# Patient Record
Sex: FEMALE | Race: BLACK OR AFRICAN AMERICAN | NOT HISPANIC OR LATINO | Employment: OTHER | URBAN - METROPOLITAN AREA
[De-identification: names, ages, dates, MRNs, and addresses within clinical notes are randomized per-mention and may not be internally consistent; named-entity substitution may affect disease eponyms.]

---

## 2018-03-27 ENCOUNTER — HOSPITAL ENCOUNTER (INPATIENT)
Facility: HOSPITAL | Age: 83
LOS: 2 days | Discharge: HOME HEALTH | DRG: 291 | End: 2018-03-29
Attending: EMERGENCY MEDICINE | Admitting: INTERNAL MEDICINE
Payer: MEDICARE

## 2018-03-27 DIAGNOSIS — I50.9 ACUTE ON CHRONIC CONGESTIVE HEART FAILURE, UNSPECIFIED CONGESTIVE HEART FAILURE TYPE: Primary | ICD-10-CM

## 2018-03-27 DIAGNOSIS — R06.00 DYSPNEA: ICD-10-CM

## 2018-03-27 DIAGNOSIS — R00.0 TACHYCARDIA: ICD-10-CM

## 2018-03-27 DIAGNOSIS — I50.9 CONGESTIVE HEART FAILURE: ICD-10-CM

## 2018-03-27 PROBLEM — Z91.199 NON-COMPLIANCE: Status: ACTIVE | Noted: 2018-03-27

## 2018-03-27 LAB
ALBUMIN SERPL BCP-MCNC: 3.5 G/DL
ALP SERPL-CCNC: 65 U/L
ALT SERPL W/O P-5'-P-CCNC: 24 U/L
ANION GAP SERPL CALC-SCNC: 13 MMOL/L
AST SERPL-CCNC: 30 U/L
BASOPHILS # BLD AUTO: 0.01 K/UL
BASOPHILS NFR BLD: 0.2 %
BILIRUB SERPL-MCNC: 0.8 MG/DL
BNP SERPL-MCNC: 4335 PG/ML
BUN SERPL-MCNC: 22 MG/DL
CALCIUM SERPL-MCNC: 9.6 MG/DL
CHLORIDE SERPL-SCNC: 107 MMOL/L
CO2 SERPL-SCNC: 24 MMOL/L
CREAT SERPL-MCNC: 1.3 MG/DL
DIFFERENTIAL METHOD: ABNORMAL
EOSINOPHIL # BLD AUTO: 0 K/UL
EOSINOPHIL NFR BLD: 0.9 %
ERYTHROCYTE [DISTWIDTH] IN BLOOD BY AUTOMATED COUNT: 15.2 %
EST. GFR  (AFRICAN AMERICAN): 43 ML/MIN/1.73 M^2
EST. GFR  (NON AFRICAN AMERICAN): 37 ML/MIN/1.73 M^2
GLUCOSE SERPL-MCNC: 101 MG/DL
HCT VFR BLD AUTO: 39.3 %
HGB BLD-MCNC: 13.2 G/DL
LYMPHOCYTES # BLD AUTO: 1.8 K/UL
LYMPHOCYTES NFR BLD: 42.2 %
MCH RBC QN AUTO: 28 PG
MCHC RBC AUTO-ENTMCNC: 33.6 G/DL
MCV RBC AUTO: 83 FL
MONOCYTES # BLD AUTO: 0.4 K/UL
MONOCYTES NFR BLD: 10.1 %
NEUTROPHILS # BLD AUTO: 2 K/UL
NEUTROPHILS NFR BLD: 46.6 %
PLATELET # BLD AUTO: 130 K/UL
PMV BLD AUTO: 12.8 FL
POTASSIUM SERPL-SCNC: 3.6 MMOL/L
PROT SERPL-MCNC: 7.2 G/DL
RBC # BLD AUTO: 4.72 M/UL
SODIUM SERPL-SCNC: 144 MMOL/L
TROPONIN I SERPL DL<=0.01 NG/ML-MCNC: 0.05 NG/ML
TROPONIN I SERPL DL<=0.01 NG/ML-MCNC: 0.05 NG/ML
WBC # BLD AUTO: 4.27 K/UL

## 2018-03-27 PROCEDURE — 93005 ELECTROCARDIOGRAM TRACING: CPT

## 2018-03-27 PROCEDURE — 85025 COMPLETE CBC W/AUTO DIFF WBC: CPT

## 2018-03-27 PROCEDURE — A4216 STERILE WATER/SALINE, 10 ML: HCPCS | Performed by: EMERGENCY MEDICINE

## 2018-03-27 PROCEDURE — 83880 ASSAY OF NATRIURETIC PEPTIDE: CPT

## 2018-03-27 PROCEDURE — 84484 ASSAY OF TROPONIN QUANT: CPT

## 2018-03-27 PROCEDURE — 63600175 PHARM REV CODE 636 W HCPCS: Performed by: EMERGENCY MEDICINE

## 2018-03-27 PROCEDURE — 25000003 PHARM REV CODE 250: Performed by: EMERGENCY MEDICINE

## 2018-03-27 PROCEDURE — 21400001 HC TELEMETRY ROOM

## 2018-03-27 PROCEDURE — 93010 ELECTROCARDIOGRAM REPORT: CPT | Mod: ,,, | Performed by: INTERNAL MEDICINE

## 2018-03-27 PROCEDURE — 99285 EMERGENCY DEPT VISIT HI MDM: CPT | Mod: 25

## 2018-03-27 PROCEDURE — 96374 THER/PROPH/DIAG INJ IV PUSH: CPT

## 2018-03-27 PROCEDURE — 80053 COMPREHEN METABOLIC PANEL: CPT

## 2018-03-27 PROCEDURE — 96375 TX/PRO/DX INJ NEW DRUG ADDON: CPT

## 2018-03-27 PROCEDURE — 96376 TX/PRO/DX INJ SAME DRUG ADON: CPT

## 2018-03-27 PROCEDURE — 84484 ASSAY OF TROPONIN QUANT: CPT | Mod: 91

## 2018-03-27 RX ORDER — FUROSEMIDE 10 MG/ML
40 INJECTION INTRAMUSCULAR; INTRAVENOUS 3 TIMES DAILY
Status: DISCONTINUED | OUTPATIENT
Start: 2018-03-27 | End: 2018-03-29 | Stop reason: HOSPADM

## 2018-03-27 RX ORDER — ASPIRIN 81 MG/1
81 TABLET ORAL DAILY
Status: DISCONTINUED | OUTPATIENT
Start: 2018-03-28 | End: 2018-03-29 | Stop reason: HOSPADM

## 2018-03-27 RX ORDER — FUROSEMIDE 10 MG/ML
40 INJECTION INTRAMUSCULAR; INTRAVENOUS
Status: COMPLETED | OUTPATIENT
Start: 2018-03-27 | End: 2018-03-27

## 2018-03-27 RX ORDER — CARVEDILOL 6.25 MG/1
12.5 TABLET ORAL 2 TIMES DAILY WITH MEALS
Status: DISCONTINUED | OUTPATIENT
Start: 2018-03-27 | End: 2018-03-29 | Stop reason: HOSPADM

## 2018-03-27 RX ORDER — SODIUM CHLORIDE 0.9 % (FLUSH) 0.9 %
3 SYRINGE (ML) INJECTION EVERY 8 HOURS
Status: DISCONTINUED | OUTPATIENT
Start: 2018-03-27 | End: 2018-03-29 | Stop reason: HOSPADM

## 2018-03-27 RX ORDER — AMLODIPINE BESYLATE 5 MG/1
10 TABLET ORAL DAILY
Status: DISCONTINUED | OUTPATIENT
Start: 2018-03-28 | End: 2018-03-29 | Stop reason: HOSPADM

## 2018-03-27 RX ORDER — ACETAMINOPHEN 500 MG
1000 TABLET ORAL EVERY 6 HOURS PRN
Status: DISCONTINUED | OUTPATIENT
Start: 2018-03-27 | End: 2018-03-29 | Stop reason: HOSPADM

## 2018-03-27 RX ORDER — ATORVASTATIN CALCIUM 40 MG/1
40 TABLET, FILM COATED ORAL DAILY
Status: DISCONTINUED | OUTPATIENT
Start: 2018-03-28 | End: 2018-03-29 | Stop reason: HOSPADM

## 2018-03-27 RX ORDER — ENOXAPARIN SODIUM 100 MG/ML
40 INJECTION SUBCUTANEOUS EVERY 24 HOURS
Status: DISCONTINUED | OUTPATIENT
Start: 2018-03-27 | End: 2018-03-28 | Stop reason: DRUGHIGH

## 2018-03-27 RX ORDER — ACETAMINOPHEN 500 MG
1000 TABLET ORAL
Status: COMPLETED | OUTPATIENT
Start: 2018-03-27 | End: 2018-03-27

## 2018-03-27 RX ORDER — HYDRALAZINE HYDROCHLORIDE 20 MG/ML
10 INJECTION INTRAMUSCULAR; INTRAVENOUS
Status: COMPLETED | OUTPATIENT
Start: 2018-03-27 | End: 2018-03-27

## 2018-03-27 RX ORDER — NAPROXEN SODIUM 220 MG/1
81 TABLET, FILM COATED ORAL
Status: COMPLETED | OUTPATIENT
Start: 2018-03-27 | End: 2018-03-27

## 2018-03-27 RX ORDER — LOSARTAN POTASSIUM 25 MG/1
100 TABLET ORAL DAILY
Status: DISCONTINUED | OUTPATIENT
Start: 2018-03-28 | End: 2018-03-29 | Stop reason: HOSPADM

## 2018-03-27 RX ADMIN — FUROSEMIDE 40 MG: 10 INJECTION, SOLUTION INTRAMUSCULAR; INTRAVENOUS at 10:03

## 2018-03-27 RX ADMIN — HYDRALAZINE HYDROCHLORIDE 10 MG: 20 INJECTION INTRAMUSCULAR; INTRAVENOUS at 06:03

## 2018-03-27 RX ADMIN — HYDRALAZINE HYDROCHLORIDE 10 MG: 20 INJECTION INTRAMUSCULAR; INTRAVENOUS at 11:03

## 2018-03-27 RX ADMIN — CARVEDILOL 12.5 MG: 6.25 TABLET, FILM COATED ORAL at 10:03

## 2018-03-27 RX ADMIN — ACETAMINOPHEN 1000 MG: 500 TABLET ORAL at 06:03

## 2018-03-27 RX ADMIN — Medication 3 ML: at 10:03

## 2018-03-27 RX ADMIN — ENOXAPARIN SODIUM 40 MG: 100 INJECTION SUBCUTANEOUS at 10:03

## 2018-03-27 RX ADMIN — ASPIRIN 81 MG 81 MG: 81 TABLET ORAL at 11:03

## 2018-03-27 NOTE — PROVIDER PROGRESS NOTES - EMERGENCY DEPT.
Encounter Date: 3/27/2018    ED Physician Progress Notes       SCRIBE NOTE: INash, am scribing for, and in the presence of,  Conner Amaro MD.  Physician Statement: IConner MD, personally performed the services described in this documentation as scribed by Nash Garcia in my presence, and it is both accurate and complete.     Physician Note:   Patient still hypertensive, sitting up in bed. Speaking in half-sentences.

## 2018-03-27 NOTE — ED PROVIDER NOTES
"Encounter Date: 3/27/2018    SCRIBE #1 NOTE: I, Nash Garcia, am scribing for, and in the presence of,  Conner Amaro MD. I have scribed the following portions of the note - Other sections scribed: HPI, ROS, and PE.       History     Chief Complaint   Patient presents with    Shortness of Breath     Pt reports increased shortness of breath starting last night. History of CHF.      CC: Shortness of Breath    HPI: 85 y.o. female with history of CHF, HTN and HLD presents to ED via EMS c/o SOB with associated chest tightness since 1130 last night. Symptoms are constant EMS applied nitroglycerin patch en route. Patient states "I think I have a fluid buildup." She has had recurrent episodes of fluid buildup for 2 mos. Dosage for her fluid medication was increased 1 mo ago. Patient has not added extra salt to her diet. No further complaints.      The history is provided by the patient. No  was used.     Review of patient's allergies indicates:   Allergen Reactions    Darvocet a500 [propoxyphene n-acetaminophen] Other (See Comments)     Dizziness     Past Medical History:   Diagnosis Date    Gout     Hyperlipidemia     Hypertension      Past Surgical History:   Procedure Laterality Date    CHOLECYSTECTOMY       History reviewed. No pertinent family history.  Social History   Substance Use Topics    Smoking status: Never Smoker    Smokeless tobacco: Not on file    Alcohol use No     Review of Systems   Constitutional: Negative for diaphoresis and fatigue.   Eyes: Negative for visual disturbance.   Respiratory: Positive for chest tightness and shortness of breath. Negative for cough.    Cardiovascular: Negative for chest pain and palpitations.   Gastrointestinal: Negative for abdominal pain, diarrhea, nausea and vomiting.   Genitourinary: Negative for dysuria.   Neurological: Negative for headaches.       Physical Exam     Initial Vitals [03/27/18 0822]   BP Pulse Resp Temp SpO2   (!) " 182/100 100 (!) 22 98.2 °F (36.8 °C) 98 %      MAP       127.33         Physical Exam    Nursing note and vitals reviewed.  Constitutional: She appears well-developed and well-nourished. She is cooperative.  Non-toxic appearance. No distress.   HENT:   Head: Normocephalic and atraumatic.   Mouth/Throat: Oropharynx is clear and moist.   Eyes: Conjunctivae and EOM are normal. Pupils are equal, round, and reactive to light.   Left lens opacity  Arcus senilis   Neck: Normal range of motion and full passive range of motion without pain. Neck supple. No thyromegaly present. No JVD present.   Cardiovascular: Normal rate, normal heart sounds and normal pulses.   Occasional PVCs on monitor   Pulmonary/Chest: Effort normal. No respiratory distress.   High basilar crackles   Abdominal: Soft. Normal appearance and bowel sounds are normal. She exhibits no distension and no mass. There is no tenderness.   Musculoskeletal: Normal range of motion.   Neurological: She is alert and oriented to person, place, and time. She has normal strength. No cranial nerve deficit or sensory deficit.   Skin: Skin is warm, dry and intact. No rash noted.   Psychiatric: She has a normal mood and affect. Her speech is normal and behavior is normal. Judgment and thought content normal.         ED Course   Critical Care  Date/Time: 3/27/2018 11:25 AM  Performed by: HYACINTH PALACIOS.  Authorized by: HYACINTH PALACIOS   Direct patient critical care time: 30 minutes  Additional history critical care time: 5 minutes  Ordering / reviewing critical care time: 5 minutes  Documentation critical care time: 5 minutes  Consulting other physicians critical care time: 5 minutes  Total critical care time (exclusive of procedural time) : 50 minutes  Critical care time was exclusive of separately billable procedures and treating other patients and teaching time.  Critical care was necessary to treat or prevent imminent or life-threatening deterioration of the  following conditions: cardiac failure and circulatory failure.  Critical care was time spent personally by me on the following activities: evaluation of patient's response to treatment, obtaining history from patient or surrogate, pulse oximetry, review of old charts, examination of patient, ordering and performing treatments and interventions, ordering and review of radiographic studies and re-evaluation of patient's condition.        Labs Reviewed   CBC W/ AUTO DIFFERENTIAL - Abnormal; Notable for the following:        Result Value    RDW 15.2 (*)     Platelets 130 (*)     All other components within normal limits    Narrative:     Recoll. [id number] by FÉLIX at 03/27/2018 09:52, reason: Specimen   hemolyzed.   03/27/2018  09:52 Called Kristin.Tube has been   refrigerated   COMPREHENSIVE METABOLIC PANEL - Abnormal; Notable for the following:     eGFR if  43 (*)     eGFR if non  37 (*)     All other components within normal limits    Narrative:     Recoll. [id number] by FÉLIX at 03/27/2018 09:52, reason: Specimen   hemolyzed.   03/27/2018  09:52 Called Kristin.Tube has been   refrigerated   TROPONIN I - Abnormal; Notable for the following:     Troponin I 0.053 (*)     All other components within normal limits    Narrative:     Recoll. [id number] by FÉLIX at 03/27/2018 09:52, reason: Specimen   hemolyzed.   03/27/2018  09:52 Called Kristin.Tube has been   refrigerated   B-TYPE NATRIURETIC PEPTIDE - Abnormal; Notable for the following:     BNP 4,335 (*)     All other components within normal limits    Narrative:     Recoll. [id number] by FÉLIX at 03/27/2018 09:52, reason: Specimen   hemolyzed.   03/27/2018  09:52 Called [email or web address].Tube has been   refrigerated     EKG Readings: (Independently Interpreted)   Initial Reading: No STEMI. Previous EKG: Compared with most recent EKG Previous EKG Date: 8/27/2015. Rhythm: Normal Sinus Rhythm. Ectopy: PVCs. Conduction: Normal. T Waves Flipped: V5, V6, II, III  and AVF. Axis: Normal. Other Impression: LVH, PVc's new compared to 8/27/2017          Medical Decision Making:   Post lasix, good diuresis. Still with 1/2 sentences and dyspnea. Noted persistent htn will offer hydralazine and admit. Discussed with Dr. Garcia.     Noted in ER to have frequent runs of supraventricular appearing tachycardia. No indix for rhythm suppression, pt is asymptomatic and breathing is much improved.             Scribe Attestation:   Scribe #1: I performed the above scribed service and the documentation accurately describes the services I performed. I attest to the accuracy of the note.    Attending Attestation:           Physician Attestation for Scribe:  Physician Attestation Statement for Scribe #1: I, Conner Amaro MD, reviewed documentation, as scribed by Nash Garcia in my presence, and it is both accurate and complete.                    Clinical Impression:   The primary encounter diagnosis was Acute on chronic congestive heart failure, unspecified congestive heart failure type. A diagnosis of Dyspnea was also pertinent to this visit.                           Conner Amaro MD  03/27/18 1126       Conner Amaro MD  03/27/18 1710       Conner Amaro MD  03/27/18 1711       Conner Amaro MD  03/27/18 1718

## 2018-03-27 NOTE — ED PROVIDER NOTES
Encounter Date: 3/27/2018       History     Chief Complaint   Patient presents with    Shortness of Breath     Pt reports increased shortness of breath starting last night. History of CHF.      HPI  Review of patient's allergies indicates:   Allergen Reactions    Darvocet a500 [propoxyphene n-acetaminophen] Other (See Comments)     Dizziness     Past Medical History:   Diagnosis Date    Gout     Hyperlipidemia     Hypertension      Past Surgical History:   Procedure Laterality Date    CHOLECYSTECTOMY       History reviewed. No pertinent family history.  Social History   Substance Use Topics    Smoking status: Never Smoker    Smokeless tobacco: Not on file    Alcohol use No     Review of Systems    Physical Exam     Initial Vitals [03/27/18 0822]   BP Pulse Resp Temp SpO2   (!) 182/100 100 (!) 22 98.2 °F (36.8 °C) 98 %      MAP       127.33         Physical Exam    ED Course   Procedures  Labs Reviewed   CBC W/ AUTO DIFFERENTIAL - Abnormal; Notable for the following:        Result Value    RDW 15.2 (*)     Platelets 130 (*)     All other components within normal limits    Narrative:     Recoll. [id number] by FÉLIX at 03/27/2018 09:52, reason: Specimen   hemolyzed.   03/27/2018  09:52 Called Kristin.Tube has been   refrigerated   COMPREHENSIVE METABOLIC PANEL - Abnormal; Notable for the following:     eGFR if  43 (*)     eGFR if non  37 (*)     All other components within normal limits    Narrative:     Recoll. [id number] by FÉLIX at 03/27/2018 09:52, reason: Specimen   hemolyzed.   03/27/2018  09:52 Called Kristin.Tube has been   refrigerated   TROPONIN I - Abnormal; Notable for the following:     Troponin I 0.053 (*)     All other components within normal limits    Narrative:     Recoll. [id number] by FÉLIX at 03/27/2018 09:52, reason: Specimen   hemolyzed.   03/27/2018  09:52 Called Kristin.Tube has been   refrigerated   B-TYPE NATRIURETIC PEPTIDE - Abnormal; Notable for the  following:     BNP 4,335 (*)     All other components within normal limits    Narrative:     Recoll. [id number] by FÉLIX at 03/27/2018 09:52, reason: Specimen   hemolyzed.   03/27/2018  09:52 Called Kristin.Tube has been   refrigerated                                  Clinical Impression:   {Add your Clinical Impression here. If you haven't documented one yet, please pend the note, finalize a Clinical Impression, and refresh your note before signing.:[id number]}

## 2018-03-27 NOTE — SUBJECTIVE & OBJECTIVE
Past Medical History:   Diagnosis Date    Gout     Hyperlipidemia     Hypertension        Past Surgical History:   Procedure Laterality Date    CHOLECYSTECTOMY         Review of patient's allergies indicates:   Allergen Reactions    Darvocet a500 [propoxyphene n-acetaminophen] Other (See Comments)     Dizziness       No current facility-administered medications on file prior to encounter.      Current Outpatient Prescriptions on File Prior to Encounter   Medication Sig    amlodipine (NORVASC) 10 MG tablet Take 10 mg by mouth once daily.    aspirin (ECOTRIN) 81 MG EC tablet Take 81 mg by mouth once daily.    atorvastatin (LIPITOR) 40 MG tablet Take 40 mg by mouth once daily.    carvedilol (COREG) 12.5 MG tablet Take 12.5 mg by mouth 2 (two) times daily with meals.    furosemide (LASIX) 40 MG tablet Take 40 mg by mouth once daily.    losartan (COZAAR) 100 MG tablet Take 100 mg by mouth once daily.    acetaminophen (TYLENOL) 500 MG tablet Take 500 mg by mouth every 6 (six) hours as needed for Pain.     Family History     None        Social History Main Topics    Smoking status: Never Smoker    Smokeless tobacco: Not on file    Alcohol use No    Drug use: No    Sexual activity: Not Currently     Review of Systems   Constitutional: Negative for activity change, appetite change, chills, diaphoresis, fatigue and fever.   HENT: Negative for congestion.    Eyes: Negative for discharge.   Respiratory: Positive for shortness of breath. Negative for apnea, cough, choking, chest tightness, wheezing and stridor.    Cardiovascular: Positive for leg swelling. Negative for chest pain.   Gastrointestinal: Negative for abdominal distention, abdominal pain, diarrhea, nausea and vomiting.   Endocrine: Negative for cold intolerance.   Genitourinary: Negative for difficulty urinating.   Musculoskeletal: Negative for arthralgias.   Neurological: Negative for dizziness and facial asymmetry.   Psychiatric/Behavioral:  Negative for agitation and behavioral problems.     Objective:     Vital Signs (Most Recent):  Temp: 97.9 °F (36.6 °C) (03/27/18 0848)  Pulse: 82 (03/27/18 1240)  Resp: (!) 22 (03/27/18 0822)  BP: (!) 189/89 (03/27/18 1240)  SpO2: 99 % (03/27/18 1240) Vital Signs (24h Range):  Temp:  [97.9 °F (36.6 °C)-98.2 °F (36.8 °C)] 97.9 °F (36.6 °C)  Pulse:  [] 82  Resp:  [22] 22  SpO2:  [98 %-100 %] 99 %  BP: (179-210)/() 189/89     Weight: 66.2 kg (146 lb)  Body mass index is 29.49 kg/m².    Physical Exam   Constitutional: She is oriented to person, place, and time. She appears well-developed and well-nourished.   HENT:   Head: Normocephalic and atraumatic.   Cardiovascular: Normal rate.  Exam reveals no gallop and no friction rub.    Pulmonary/Chest: Effort normal. No respiratory distress. She has rales.   Abdominal: Soft. Bowel sounds are normal. She exhibits no distension. There is no guarding.   Neurological: She is alert and oriented to person, place, and time.   Skin: Skin is warm and dry.   Psychiatric: She has a normal mood and affect. Her behavior is normal.   Vitals reviewed.          Significant Labs:  CXR reviewed.  BNP 4,335  Troponin .053    EKG NSR with PVC's.

## 2018-03-27 NOTE — H&P
[facility] Hospital Medicine  History & Physical    Patient Name: Kasie Faye  MRN: [id number]  Admission Date: 3/27/2018  Attending Physician: Conner Amaro MD   Primary Care Provider: [facility]         Patient information was obtained from patient and ER records.     Subjective:     Principal Problem:Acute on chronic congestive heart failure    Chief Complaint:   Chief Complaint   Patient presents with    Shortness of Breath     Pt reports increased shortness of breath starting last night. History of CHF.         HPI: Mrs. Faye is a 86 yo F from home who presents with a CC of SOB since last night. The patient has a known history of hypertension but states that she is non compliant with both diet and medications.  She was in her normal state of health until last night when she started to experience SOB. The patient admits to a known history of heart failure and noted a hospital admission last in 2010 for this. She does not have an echo on file here. She presents to the ED and is found to have a BNP of 4,335 in the setting of normal renal function.  She denied any CP. She states this feels similar to her CHF in 2010. The patient is resting comfortably and did not present with respiratory distress or failure.  The patient lives at home alone and does not use a walker or cane.        Past Medical History:   Diagnosis Date    Gout     Hyperlipidemia     Hypertension        Past Surgical History:   Procedure Laterality Date    CHOLECYSTECTOMY         Review of patient's allergies indicates:   Allergen Reactions    Darvocet a500 [propoxyphene n-acetaminophen] Other (See Comments)     Dizziness       No current facility-administered medications on file prior to encounter.      Current Outpatient Prescriptions on File Prior to Encounter   Medication Sig    amlodipine (NORVASC) 10 MG tablet Take 10 mg by mouth once daily.    aspirin (ECOTRIN) 81 MG EC tablet Take  81 mg by mouth once daily.    atorvastatin (LIPITOR) 40 MG tablet Take 40 mg by mouth once daily.    carvedilol (COREG) 12.5 MG tablet Take 12.5 mg by mouth 2 (two) times daily with meals.    furosemide (LASIX) 40 MG tablet Take 40 mg by mouth once daily.    losartan (COZAAR) 100 MG tablet Take 100 mg by mouth once daily.    acetaminophen (TYLENOL) 500 MG tablet Take 500 mg by mouth every 6 (six) hours as needed for Pain.     Family History     None        Social History Main Topics    Smoking status: Never Smoker    Smokeless tobacco: Not on file    Alcohol use No    Drug use: No    Sexual activity: Not Currently     Review of Systems   Constitutional: Negative for activity change, appetite change, chills, diaphoresis, fatigue and fever.   HENT: Negative for congestion.    Eyes: Negative for discharge.   Respiratory: Positive for shortness of breath. Negative for apnea, cough, choking, chest tightness, wheezing and stridor.    Cardiovascular: Positive for leg swelling. Negative for chest pain.   Gastrointestinal: Negative for abdominal distention, abdominal pain, diarrhea, nausea and vomiting.   Endocrine: Negative for cold intolerance.   Genitourinary: Negative for difficulty urinating.   Musculoskeletal: Negative for arthralgias.   Neurological: Negative for dizziness and facial asymmetry.   Psychiatric/Behavioral: Negative for agitation and behavioral problems.     Objective:     Vital Signs (Most Recent):  Temp: 97.9 °F (36.6 °C) (03/27/18 0848)  Pulse: 82 (03/27/18 1240)  Resp: (!) 22 (03/27/18 0822)  BP: (!) 189/89 (03/27/18 1240)  SpO2: 99 % (03/27/18 1240) Vital Signs (24h Range):  Temp:  [97.9 °F (36.6 °C)-98.2 °F (36.8 °C)] 97.9 °F (36.6 °C)  Pulse:  [] 82  Resp:  [22] 22  SpO2:  [98 %-100 %] 99 %  BP: (179-210)/() 189/89     Weight: 66.2 kg (146 lb)  Body mass index is 29.49 kg/m².    Physical Exam   Constitutional: She is oriented to person, place, and time. She appears  well-developed and well-nourished.   HENT:   Head: Normocephalic and atraumatic.   Cardiovascular: Normal rate.  Exam reveals no gallop and no friction rub.    Pulmonary/Chest: Effort normal. No respiratory distress. She has rales.   Abdominal: Soft. Bowel sounds are normal. She exhibits no distension. There is no guarding.   Neurological: She is alert and oriented to person, place, and time.   Skin: Skin is warm and dry.   Psychiatric: She has a normal mood and affect. Her behavior is normal.   Vitals reviewed.          Significant Labs:  CXR reviewed.  BNP 4,335  Troponin .053    EKG NSR with PVC's.           Assessment/Plan:     * Acute on chronic congestive heart failure    Unknown type as no previous EKG. Presenting with a BNP of 4,335 in setting of normal renal function.    Will check Echo. Start on IV lasix.  Expect short hospital stay.  Non compliance with diet and medications likely the cause.             Non-compliance    As discussed.             Hyperlipidemia    Resume home meds.           Benign essential hypertension    Presented with elevated BP's- not taking meds as prescribed. Discussion held with the patient and family           elevated troponin- likely from acute CHF. Will trend out.     VTE Risk Mitigation     None        Lasix. Echo. Home in 1-2 days.          Sly Garcia MD  Department of Hospital Medicine   [facility]

## 2018-03-27 NOTE — ED NOTES
Report received from IBAN Desai for lunch relief. Pt appears in no acute distress, is resting quietly, and is alert at this time. No further needs identified.

## 2018-03-27 NOTE — ASSESSMENT & PLAN NOTE
Presented with elevated BP's- not taking meds as prescribed. Discussion held with the patient and family

## 2018-03-27 NOTE — ASSESSMENT & PLAN NOTE
Unknown type as no previous EKG. Presenting with a BNP of 4,335 in setting of normal renal function.    Will check Echo. Start on IV lasix.  Expect short hospital stay.  Non compliance with diet and medications likely the cause.

## 2018-03-27 NOTE — PLAN OF CARE
Patient from home alone and independent.  Daughter available to help at home if needed.  Patient receives primary care at [facility] and prefers morning appts..  Stated that [facility] also provides her medications.  Patient plans to return to home at discharge.       03/27/18 1145   Discharge Assessment   Assessment Type Discharge Planning Assessment   Confirmed/corrected address and phone number on facesheet? Yes   Assessment information obtained from? Patient  (Daughter Jailene at the bedside.)   Prior to hospitilization cognitive status: Alert/Oriented   Prior to hospitalization functional status: Independent   Current cognitive status: Alert/Oriented   Current Functional Status: Needs Assistance   Facility Arrived From: home   Lives With alone   Able to Return to Prior Arrangements yes   Is patient able to care for self after discharge? Unable to determine at this time (comments)   Who are your caregiver(s) and their phone number(s)? Jailene Mistry: [phone]   Patient's perception of discharge disposition home or selfcare   Readmission Within The Last 30 Days no previous admission in last 30 days   Patient currently being followed by outpatient case management? No   Patient currently receives any other outside agency services? No   Equipment Currently Used at Home none   Do you have any problems affording any of your prescribed medications? No   Is the patient taking medications as prescribed? yes   Does the patient have transportation home? Yes   Transportation Available car   Does the patient receive [facility] at [facility]? (Unknown)   Discharge Plan A Home   Discharge Plan B Home   Patient/Family In Agreement With Plan yes   Sherine Carlos LMSW, [place], CCM  3/27/2018

## 2018-03-27 NOTE — HOSPITAL COURSE
The patient presents with a CC of SOB and is found to be in heart failure with a BNP of 4,335. The patient has a known history of CHF but does not have an echo here.  She was started on IV lasix.  An echo of the heart was ordered. The patient clinically improved rapidly. Her Echo showed an EF of 40-45% with diastolic heart failure. She will be discharged on the appropriate meds. She is requesting discharge. No 02 requirements. CC of SOB has resolved.  Activity as tolerated. Diet- low NA. Follow up with PCP in one week. Will have cards follow up in one week with Dr. Rain. Follow up with ortho in 1-2 weeks for R ankle pain- possible ligament injury per x-rays.       I will call the patient at home to check on on 3/30.

## 2018-03-27 NOTE — HPI
Mrs. Faye is a 86 yo F from home who presents with a CC of SOB since last night. The patient has a known history of hypertension but states that she is non compliant with both diet and medications.  She was in her normal state of health until last night when she started to experience SOB. The patient admits to a known history of heart failure and noted a hospital admission last in 2010 for this. She does not have an echo on file here. She presents to the ED and is found to have a BNP of 4,335 in the setting of normal renal function.  She denied any CP. She states this feels similar to her CHF in 2010. The patient is resting comfortably and did not present with respiratory distress or failure.  The patient lives at home alone and does not use a walker or cane.

## 2018-03-27 NOTE — ED NOTES
Patient additional EKG reading atrial tachycardia with PVCs. Patient asymptomatic and MD aware of rhythm. No interventions at this time

## 2018-03-27 NOTE — ED TRIAGE NOTES
Patient presents via EMS with c/o chest tightness, productive cough mixed with streaks of blood and SOB since last night. Denies N/V, abdominal pain, dizziness, headache

## 2018-03-28 PROBLEM — N18.30 CKD (CHRONIC KIDNEY DISEASE), STAGE III: Status: ACTIVE | Noted: 2018-03-28

## 2018-03-28 PROBLEM — R79.89 ELEVATED TROPONIN: Status: ACTIVE | Noted: 2018-03-28

## 2018-03-28 LAB
ALBUMIN SERPL BCP-MCNC: 3.1 G/DL
ALP SERPL-CCNC: 59 U/L
ALT SERPL W/O P-5'-P-CCNC: 18 U/L
ANION GAP SERPL CALC-SCNC: 13 MMOL/L
ANION GAP SERPL CALC-SCNC: 13 MMOL/L
AORTIC VALVE REGURGITATION: ABNORMAL
AST SERPL-CCNC: 27 U/L
BASOPHILS # BLD AUTO: 0.01 K/UL
BASOPHILS NFR BLD: 0.2 %
BILIRUB SERPL-MCNC: 1 MG/DL
BUN SERPL-MCNC: 21 MG/DL
BUN SERPL-MCNC: 22 MG/DL
CALCIUM SERPL-MCNC: 9.5 MG/DL
CALCIUM SERPL-MCNC: 9.5 MG/DL
CHLORIDE SERPL-SCNC: 105 MMOL/L
CHLORIDE SERPL-SCNC: 106 MMOL/L
CO2 SERPL-SCNC: 23 MMOL/L
CO2 SERPL-SCNC: 25 MMOL/L
CREAT SERPL-MCNC: 1.2 MG/DL
CREAT SERPL-MCNC: 1.4 MG/DL
DIASTOLIC DYSFUNCTION: YES
DIFFERENTIAL METHOD: ABNORMAL
EOSINOPHIL # BLD AUTO: 0 K/UL
EOSINOPHIL NFR BLD: 1 %
ERYTHROCYTE [DISTWIDTH] IN BLOOD BY AUTOMATED COUNT: 15.2 %
EST. GFR  (AFRICAN AMERICAN): 40 ML/MIN/1.73 M^2
EST. GFR  (AFRICAN AMERICAN): 48 ML/MIN/1.73 M^2
EST. GFR  (NON AFRICAN AMERICAN): 34 ML/MIN/1.73 M^2
EST. GFR  (NON AFRICAN AMERICAN): 41 ML/MIN/1.73 M^2
ESTIMATED PA SYSTOLIC PRESSURE: 24.53
GLUCOSE SERPL-MCNC: 72 MG/DL
GLUCOSE SERPL-MCNC: 86 MG/DL
HCT VFR BLD AUTO: 38.9 %
HGB BLD-MCNC: 12.9 G/DL
LYMPHOCYTES # BLD AUTO: 1.4 K/UL
LYMPHOCYTES NFR BLD: 32.8 %
MAGNESIUM SERPL-MCNC: 1.9 MG/DL
MCH RBC QN AUTO: 27.6 PG
MCHC RBC AUTO-ENTMCNC: 33.2 G/DL
MCV RBC AUTO: 83 FL
MITRAL VALVE REGURGITATION: ABNORMAL
MONOCYTES # BLD AUTO: 0.6 K/UL
MONOCYTES NFR BLD: 15 %
NEUTROPHILS # BLD AUTO: 2.1 K/UL
NEUTROPHILS NFR BLD: 50.8 %
PHOSPHATE SERPL-MCNC: 4.5 MG/DL
PLATELET # BLD AUTO: 131 K/UL
PMV BLD AUTO: 12.2 FL
POTASSIUM SERPL-SCNC: 3.5 MMOL/L
POTASSIUM SERPL-SCNC: 3.8 MMOL/L
PROT SERPL-MCNC: 6.5 G/DL
RBC # BLD AUTO: 4.68 M/UL
RETIRED EF AND QEF - SEE NOTES: 40 (ref 55–65)
SODIUM SERPL-SCNC: 142 MMOL/L
SODIUM SERPL-SCNC: 143 MMOL/L
TRICUSPID VALVE REGURGITATION: ABNORMAL
TROPONIN I SERPL DL<=0.01 NG/ML-MCNC: 0.08 NG/ML
TROPONIN I SERPL DL<=0.01 NG/ML-MCNC: 0.09 NG/ML
WBC # BLD AUTO: 4.21 K/UL

## 2018-03-28 PROCEDURE — 84100 ASSAY OF PHOSPHORUS: CPT

## 2018-03-28 PROCEDURE — A4216 STERILE WATER/SALINE, 10 ML: HCPCS | Performed by: EMERGENCY MEDICINE

## 2018-03-28 PROCEDURE — 63600175 PHARM REV CODE 636 W HCPCS: Performed by: EMERGENCY MEDICINE

## 2018-03-28 PROCEDURE — 85025 COMPLETE CBC W/AUTO DIFF WBC: CPT

## 2018-03-28 PROCEDURE — 63600175 PHARM REV CODE 636 W HCPCS: Performed by: INTERNAL MEDICINE

## 2018-03-28 PROCEDURE — 83735 ASSAY OF MAGNESIUM: CPT

## 2018-03-28 PROCEDURE — 25000003 PHARM REV CODE 250: Performed by: EMERGENCY MEDICINE

## 2018-03-28 PROCEDURE — 80048 BASIC METABOLIC PNL TOTAL CA: CPT

## 2018-03-28 PROCEDURE — 80053 COMPREHEN METABOLIC PANEL: CPT

## 2018-03-28 PROCEDURE — 21400001 HC TELEMETRY ROOM

## 2018-03-28 PROCEDURE — 93306 TTE W/DOPPLER COMPLETE: CPT

## 2018-03-28 PROCEDURE — 36415 COLL VENOUS BLD VENIPUNCTURE: CPT

## 2018-03-28 PROCEDURE — 84484 ASSAY OF TROPONIN QUANT: CPT

## 2018-03-28 PROCEDURE — 84484 ASSAY OF TROPONIN QUANT: CPT | Mod: 91

## 2018-03-28 PROCEDURE — 93306 TTE W/DOPPLER COMPLETE: CPT | Mod: 26,,, | Performed by: INTERNAL MEDICINE

## 2018-03-28 RX ORDER — ENOXAPARIN SODIUM 100 MG/ML
30 INJECTION SUBCUTANEOUS EVERY 24 HOURS
Status: DISCONTINUED | OUTPATIENT
Start: 2018-03-28 | End: 2018-03-29 | Stop reason: HOSPADM

## 2018-03-28 RX ADMIN — NITROGLYCERIN 1 INCH: 20 OINTMENT TOPICAL at 12:03

## 2018-03-28 RX ADMIN — NITROGLYCERIN 1 INCH: 20 OINTMENT TOPICAL at 06:03

## 2018-03-28 RX ADMIN — ASPIRIN 81 MG: 81 TABLET, COATED ORAL at 09:03

## 2018-03-28 RX ADMIN — ENOXAPARIN SODIUM 30 MG: 100 INJECTION SUBCUTANEOUS at 06:03

## 2018-03-28 RX ADMIN — Medication 3 ML: at 10:03

## 2018-03-28 RX ADMIN — ATORVASTATIN CALCIUM 40 MG: 40 TABLET, FILM COATED ORAL at 09:03

## 2018-03-28 RX ADMIN — Medication 3 ML: at 06:03

## 2018-03-28 RX ADMIN — LOSARTAN POTASSIUM 100 MG: 25 TABLET, FILM COATED ORAL at 09:03

## 2018-03-28 RX ADMIN — Medication 3 ML: at 02:03

## 2018-03-28 RX ADMIN — CARVEDILOL 12.5 MG: 6.25 TABLET, FILM COATED ORAL at 07:03

## 2018-03-28 RX ADMIN — NITROGLYCERIN 1 INCH: 20 OINTMENT TOPICAL at 09:03

## 2018-03-28 RX ADMIN — FUROSEMIDE 40 MG: 10 INJECTION, SOLUTION INTRAMUSCULAR; INTRAVENOUS at 09:03

## 2018-03-28 RX ADMIN — FUROSEMIDE 40 MG: 10 INJECTION, SOLUTION INTRAMUSCULAR; INTRAVENOUS at 03:03

## 2018-03-28 RX ADMIN — AMLODIPINE BESYLATE 10 MG: 5 TABLET ORAL at 09:03

## 2018-03-28 RX ADMIN — CARVEDILOL 12.5 MG: 6.25 TABLET, FILM COATED ORAL at 06:03

## 2018-03-28 NOTE — PLAN OF CARE
Problem: Patient Care Overview  Goal: Plan of Care Review  Outcome: Ongoing (interventions implemented as appropriate)  All systems assessed. No falls or injuries this shift. Will continue to monitor.

## 2018-03-28 NOTE — ASSESSMENT & PLAN NOTE
Unknown type as no previous EKG. Presenting with a BNP of 4,335 in setting of normal renal function.    Will check Echo. Start on IV lasix.  Expect short hospital stay.  Non compliance with diet and medications likely the cause.   Echo today. Continue lasix.  Clinically improved. BNP in am.  Likely home on 3/29.

## 2018-03-28 NOTE — PROGRESS NOTES
[facility] Hospital Medicine  Progress Note    Patient Name: Kasie Faye  MRN: [id number]  Patient Class: IP- Inpatient   Admission Date: 3/27/2018  Length of Stay: 1 days  Attending Physician: Sly Adams MD  Primary Care Provider: [facility]        Subjective:     Principal Problem:Acute on chronic congestive heart failure    HPI:  Mrs. Faye is a 86 yo F from home who presents with a CC of SOB since last night. The patient has a known history of hypertension but states that she is non compliant with both diet and medications.  She was in her normal state of health until last night when she started to experience SOB. The patient admits to a known history of heart failure and noted a hospital admission last in 2010 for this. She does not have an echo on file here. She presents to the ED and is found to have a BNP of 4,335 in the setting of normal renal function.  She denied any CP. She states this feels similar to her CHF in 2010. The patient is resting comfortably and did not present with respiratory distress or failure.  The patient lives at home alone and does not use a walker or cane.        Hospital Course:  The patient presents with a CC of SOB and is found to be in heart failure with a BNP of 4,335. The patient has a known history of CHF but does not have an echo here.  She was started on IV lasix.  An echo of the heart was ordered.       Interval History: Feels better.  Less SOB     Review of Systems   Constitutional: Negative for activity change.   HENT: Negative for congestion.    Respiratory: Negative for chest tightness and shortness of breath.    Cardiovascular: Negative for chest pain.   Gastrointestinal: Negative for abdominal distention.   Genitourinary: Negative for difficulty urinating.     Objective:     Vital Signs (Most Recent):  Temp: 98.7 °F (37.1 °C) (03/28/18 0733)  Pulse: 68 (03/28/18 0733)  Resp: 18 (03/28/18 0733)  BP: (!) 151/72  (03/28/18 0733)  SpO2: (!) 93 % (03/28/18 0733) Vital Signs (24h Range):  Temp:  [97.8 °F (36.6 °C)-98.7 °F (37.1 °C)] 98.7 °F (37.1 °C)  Pulse:  [61-94] 68  Resp:  [18] 18  SpO2:  [93 %-100 %] 93 %  BP: (146-218)/() 151/72     Weight: 62.2 kg (137 lb 2 oz)  Body mass index is 27.7 kg/m².    Intake/Output Summary (Last 24 hours) at 03/28/18 1109  Last data filed at 03/28/18 0600   Gross per 24 hour   Intake                3 ml   Output              300 ml   Net             -297 ml      Physical Exam   Constitutional: She is oriented to person, place, and time. She appears well-developed and well-nourished.   HENT:   Head: Normocephalic and atraumatic.   Cardiovascular: Normal rate and normal heart sounds.    Pulmonary/Chest: Effort normal and breath sounds normal. No respiratory distress. She has no wheezes. She has no rales.   Neurological: She is alert and oriented to person, place, and time.   Psychiatric: She has a normal mood and affect. Her behavior is normal.   Vitals reviewed.       Significant Labs:   BMP:   Recent Labs  Lab 03/28/18  0730   GLU 72      K 3.5      CO2 25   BUN 22   CREATININE 1.4   CALCIUM 9.5   MG 1.9     CBC:   Recent Labs  Lab 03/27/18  1007 03/28/18  0729   WBC 4.27 4.21   HGB 13.2 12.9   HCT 39.3 38.9   * 131*       Significant Imaging:    Assessment/Plan:      * Acute on chronic congestive heart failure    Unknown type as no previous EKG. Presenting with a BNP of 4,335 in setting of normal renal function.    Will check Echo. Start on IV lasix.  Expect short hospital stay.  Non compliance with diet and medications likely the cause.   Echo today. Continue lasix.  Clinically improved. BNP in am.  Likely home on 3/29.               Elevated troponin    In setting of acute heart failure and CKD III- flat pattern. No ACS.           CKD (chronic kidney disease), stage III    At baseline.           Non-compliance    As discussed.             Hyperlipidemia    Resume  home meds.           Benign essential hypertension    Presented with elevated BP's- not taking meds as prescribed. Discussion held with the patient and family             VTE Risk Mitigation         Ordered     enoxaparin injection 30 mg  Daily     Route:  Subcutaneous        03/28/18 0929     IP VTE HIGH RISK PATIENT  Once      03/27/18 2049      Continue current plan. Laix. BMP BNP in am.   Home likely on 3/29.   Echo today         Sly Garcia MD  Department of Hospital Medicine   [facility]

## 2018-03-28 NOTE — PLAN OF CARE
Problem: Cardiac: Heart Failure (Adult)  Intervention: Promote Functional Ability   03/28/18 0133   Activity   Activity Assistance Provided assistance, 1 person   Daily Care Interventions   Self-Care Promotion independence encouraged;BADL personal objects within reach;BADL personal routines maintained;meal setup provided;safe use of adaptive equipment encouraged     Intervention: Provide Oxygenation/Ventilation/Perfusion Support   03/28/18 0133   Activity   Activity Type activity adjusted per tolerance;bedrest with commode     Intervention: Support Psychosocial Response to Heart Failure   03/28/18 0133   Coping/Psychosocial Interventions   Supportive Measures positive reinforcement provided;self-care encouraged;relaxation techniques promoted;verbalization of feelings encouraged;active listening utilized;counseling provided;decision-making supported;goal setting facilitated;self-reflection promoted;self-responsibility promoted   Psychosocial Support   Family/Support System Care self-care encouraged     Intervention: Gradually Correct Positive Fluid Balance   03/28/18 0133   Nutrition Interventions   Fluid/Electrolyte Management fluids restricted   Positioning   Body Position ambulate to bathroom;log-rolled;supine, head elevated;weight shift assistance provided   Skin Interventions   Skin Protection Incontinence pads;Protective footwear;Tubing/devices free from skin contact     Intervention: Prevent/Manage DVT/VTE Risk   03/27/18 2044 03/28/18 0133   Minimize Embolism Risk   VTE Prevention/Management --  ambulation promoted;bleeding precautions maintained;dorsiflexion/plantar flexion performed   OTHER   VTE Required Core Measure Pharmacological prophylaxis initiated/maintained  (Lovenox ordered) --      Intervention: Monitor/Manage Cardiac Rhythm Disturbance   03/28/18 0133   Cardiac Interventions   Dysrhythmia Management forceful cough encouraged       Goal: Signs and Symptoms of Listed Potential Problems Will be  Absent, Minimized or Managed (Cardiac: Heart Failure)  Signs and symptoms of listed potential problems will be absent, minimized or managed by discharge/transition of care (reference Cardiac: Heart Failure (Adult) CPG).   Outcome: Ongoing (interventions implemented as appropriate)   03/28/18 0133   Cardiac: Heart Failure   Problems Assessed (Heart Failure) all   Problems Present (Heart Failure) decreased quality of life;fluid/electrolyte imbalance;dysrhythmia/arrhythmia;functional decline/self-care deficit;respiratory compromise;cardiac pump dysfunction

## 2018-03-28 NOTE — SUBJECTIVE & OBJECTIVE
Interval History: Feels better.  Less SOB     Review of Systems   Constitutional: Negative for activity change.   HENT: Negative for congestion.    Respiratory: Negative for chest tightness and shortness of breath.    Cardiovascular: Negative for chest pain.   Gastrointestinal: Negative for abdominal distention.   Genitourinary: Negative for difficulty urinating.     Objective:     Vital Signs (Most Recent):  Temp: 98.7 °F (37.1 °C) (03/28/18 0733)  Pulse: 68 (03/28/18 0733)  Resp: 18 (03/28/18 0733)  BP: (!) 151/72 (03/28/18 0733)  SpO2: (!) 93 % (03/28/18 0733) Vital Signs (24h Range):  Temp:  [97.8 °F (36.6 °C)-98.7 °F (37.1 °C)] 98.7 °F (37.1 °C)  Pulse:  [61-94] 68  Resp:  [18] 18  SpO2:  [93 %-100 %] 93 %  BP: (146-218)/() 151/72     Weight: 62.2 kg (137 lb 2 oz)  Body mass index is 27.7 kg/m².    Intake/Output Summary (Last 24 hours) at 03/28/18 1109  Last data filed at 03/28/18 0600   Gross per 24 hour   Intake                3 ml   Output              300 ml   Net             -297 ml      Physical Exam   Constitutional: She is oriented to person, place, and time. She appears well-developed and well-nourished.   HENT:   Head: Normocephalic and atraumatic.   Cardiovascular: Normal rate and normal heart sounds.    Pulmonary/Chest: Effort normal and breath sounds normal. No respiratory distress. She has no wheezes. She has no rales.   Neurological: She is alert and oriented to person, place, and time.   Psychiatric: She has a normal mood and affect. Her behavior is normal.   Vitals reviewed.       Significant Labs:   BMP:   Recent Labs  Lab 03/28/18  0730   GLU 72      K 3.5      CO2 25   BUN 22   CREATININE 1.4   CALCIUM 9.5   MG 1.9     CBC:   Recent Labs  Lab 03/27/18  1007 03/28/18  0729   WBC 4.27 4.21   HGB 13.2 12.9   HCT 39.3 38.9   * 131*       Significant Imaging:

## 2018-03-29 VITALS
TEMPERATURE: 97 F | OXYGEN SATURATION: 94 % | RESPIRATION RATE: 16 BRPM | HEIGHT: 59 IN | BODY MASS INDEX: 29.68 KG/M2 | WEIGHT: 147.25 LBS | SYSTOLIC BLOOD PRESSURE: 121 MMHG | DIASTOLIC BLOOD PRESSURE: 58 MMHG | HEART RATE: 69 BPM

## 2018-03-29 PROBLEM — R79.89 ELEVATED TROPONIN: Status: RESOLVED | Noted: 2018-03-28 | Resolved: 2018-03-29

## 2018-03-29 LAB
ANION GAP SERPL CALC-SCNC: 10 MMOL/L
BNP SERPL-MCNC: 614 PG/ML
BUN SERPL-MCNC: 25 MG/DL
CALCIUM SERPL-MCNC: 9.4 MG/DL
CHLORIDE SERPL-SCNC: 104 MMOL/L
CO2 SERPL-SCNC: 26 MMOL/L
CREAT SERPL-MCNC: 1.5 MG/DL
EST. GFR  (AFRICAN AMERICAN): 36 ML/MIN/1.73 M^2
EST. GFR  (NON AFRICAN AMERICAN): 32 ML/MIN/1.73 M^2
GLUCOSE SERPL-MCNC: 91 MG/DL
POTASSIUM SERPL-SCNC: 3.2 MMOL/L
SODIUM SERPL-SCNC: 140 MMOL/L

## 2018-03-29 PROCEDURE — 63600175 PHARM REV CODE 636 W HCPCS: Performed by: EMERGENCY MEDICINE

## 2018-03-29 PROCEDURE — 25000003 PHARM REV CODE 250: Performed by: EMERGENCY MEDICINE

## 2018-03-29 PROCEDURE — A4216 STERILE WATER/SALINE, 10 ML: HCPCS | Performed by: EMERGENCY MEDICINE

## 2018-03-29 PROCEDURE — 36415 COLL VENOUS BLD VENIPUNCTURE: CPT

## 2018-03-29 PROCEDURE — 83880 ASSAY OF NATRIURETIC PEPTIDE: CPT

## 2018-03-29 PROCEDURE — 80048 BASIC METABOLIC PNL TOTAL CA: CPT

## 2018-03-29 RX ORDER — FUROSEMIDE 20 MG/1
20 TABLET ORAL 2 TIMES DAILY
Qty: 60 TABLET | Refills: 5 | Status: SHIPPED | OUTPATIENT
Start: 2018-03-29 | End: 2018-03-29 | Stop reason: HOSPADM

## 2018-03-29 RX ADMIN — FUROSEMIDE 40 MG: 10 INJECTION, SOLUTION INTRAMUSCULAR; INTRAVENOUS at 09:03

## 2018-03-29 RX ADMIN — NITROGLYCERIN 1 INCH: 20 OINTMENT TOPICAL at 06:03

## 2018-03-29 RX ADMIN — AMLODIPINE BESYLATE 10 MG: 5 TABLET ORAL at 09:03

## 2018-03-29 RX ADMIN — NITROGLYCERIN 1 INCH: 20 OINTMENT TOPICAL at 12:03

## 2018-03-29 RX ADMIN — ASPIRIN 81 MG: 81 TABLET, COATED ORAL at 09:03

## 2018-03-29 RX ADMIN — CARVEDILOL 12.5 MG: 6.25 TABLET, FILM COATED ORAL at 08:03

## 2018-03-29 RX ADMIN — Medication 3 ML: at 12:03

## 2018-03-29 RX ADMIN — Medication 3 ML: at 06:03

## 2018-03-29 RX ADMIN — LOSARTAN POTASSIUM 100 MG: 25 TABLET, FILM COATED ORAL at 09:03

## 2018-03-29 RX ADMIN — ATORVASTATIN CALCIUM 40 MG: 40 TABLET, FILM COATED ORAL at 09:03

## 2018-03-29 NOTE — PROGRESS NOTES
TN arranged PCP hospital follow up appointment with Yunier on Monday, 4/2  @ 11:30am.Cardiology appointment arranged with Jovanny Bhandari MD (Yunier) on Monday , 4/2 @ 10:30am. Spoke with Alexandra.

## 2018-03-29 NOTE — PROGRESS NOTES
TN spoke with Karina ([facility]) in regards to patient's recommendation for Home Health. Patient received care with [facility] in the past and Karina recommended sending request to [facility] again.    1:42- TN sent 3 day Packet, POC, DC Summary, and current meds to [facility] and other facilities. Awaiting response.      Patient accepted by [facility].

## 2018-03-29 NOTE — PLAN OF CARE
Problem: Fall Risk (Adult)  Goal: Identify Related Risk Factors and Signs and Symptoms  Related risk factors and signs and symptoms are identified upon initiation of Human Response Clinical Practice Guideline (CPG)   Outcome: Ongoing (interventions implemented as appropriate)  All systems assessed. No falls opr injuries this shift. Patient very independent. Instructed to call for assistance to get up and walk. Noted patient went on the BSC alone. Will continue to monitor.

## 2018-03-29 NOTE — PLAN OF CARE
"TN reviewed follow up appointment information as well as  "CHF discharge instructions" handout with patient using teach back. Patient is in agreement and verbalized an understanding. Placed discharge information in blue discharge folder.  TN also reviewed patient responsibility checklist with her using teach back. Patient was able to verbalize her responsibilities after discharge to manage her care at home bein. Going to follow up appointments   2. Picking up rx from the pharmacy when discharged  3. Taking her medications as prescribed      TN spoke with Deandra ([facility]) to notify of patient's need for referral to Orthopedic doctor. PCP will arrange referral at patient's doctor appt.     Patient informed that nurse will contact her from [facility] to arrange a    Patient's nurse informed that patient can discharge from  standpoint.          18 1445   Final Note   Assessment Type Final Discharge Note   Discharge Disposition Home-Health   What phone number can be called within the next 1-3 days to see how you are doing after discharge? ([phone])   Hospital Follow Up  Appt(s) scheduled? Yes   Discharge plans and expectations educations in teach back method with documentation complete? Yes   Right Care Referral Info   Post Acute Recommendation Home-care     "

## 2018-03-29 NOTE — PROGRESS NOTES
WRITTEN HEALTHCARE DISCHARGE INFORMATION     Things that YOU are responsible for to Manage Your Care At Home:  1. Getting your prescriptions filled.  2. Taking you medications as directed. DO NOT MISS ANY DOSES!  3. Going to your follow-up doctor appointments. This is important because it allows the doctor to monitor your progress and to determine if any changes need to be made to your treatment plan.    If you are unable to make your follow up appointments, please call the number listed and reschedule this appointment.     After discharge, if you need assistance, you can call [facility] On Call Nurse Care Line for 24/7 assistance at [phone]    Thank you for choosing [facility] and allowing us to care for you.   From your care manager:  Jennifer CARRANZA RN,TN  [phone]     You should receive a call from [facility] within 48-72 hours to help manage your care after discharge. Please try to make sure that you answer your phone for this important phone call.       Follow-up Information     Wesley Guadalupe MD In 1 week.    Specialty:  Orthopedic Surgery  Contact information:  [address]  [address]  [facility] [place] [address]  [phone]             [facility] On 4/2/2018.    Why:   On Monday- Cardiology @ 10:30am, PCP @ 11:30am outpatient services,   Contact information:  [address]  [facility] [place] [address]  [phone]             [facility].    Specialties:  Home Health Services, Physical Therapy, Occupational Therapy  Contact information:  [address] [address]  [phone]

## 2018-03-29 NOTE — ASSESSMENT & PLAN NOTE
Unknown type as no previous EKG. Presenting with a BNP of 4,335 in setting of normal renal function.    Will check Echo. Start on IV lasix.  Expect short hospital stay.  Non compliance with diet and medications likely the cause.   Echo today. Continue lasix.  Clinically improved. BNP in am.  Likely home on 3/29. EF of 40-45%.will review meds.  CC has resolved. Acute combined systolic and diastolic heart failure.- resolved.

## 2018-03-29 NOTE — PROGRESS NOTES
[facility] Hospital Medicine  Progress Note    Patient Name: Kasie Faye  MRN: [id number]  Patient Class: IP- Inpatient   Admission Date: 3/27/2018  Length of Stay: 2 days  Attending Physician: Sly Adams MD  Primary Care Provider: [facility]        Subjective:     Principal Problem:Acute on chronic congestive heart failure    HPI:  Mrs. Faye is a 84 yo F from home who presents with a CC of SOB since last night. The patient has a known history of hypertension but states that she is non compliant with both diet and medications.  She was in her normal state of health until last night when she started to experience SOB. The patient admits to a known history of heart failure and noted a hospital admission last in 2010 for this. She does not have an echo on file here. She presents to the ED and is found to have a BNP of 4,335 in the setting of normal renal function.  She denied any CP. She states this feels similar to her CHF in 2010. The patient is resting comfortably and did not present with respiratory distress or failure.  The patient lives at home alone and does not use a walker or cane.        Hospital Course:  The patient presents with a CC of SOB and is found to be in heart failure with a BNP of 4,335. The patient has a known history of CHF but does not have an echo here.  She was started on IV lasix.  An echo of the heart was ordered. The patient clinically improved rapidly. Her Echo showed an EF of 40-45% with diastolic heart failure. She will be discharged on the appropriate meds. She is requesting discharge. No 02 requirements. CC of SOB has resolved.  Activity as tolerated. Diet- low NA. Follow up with PCP in one week         Interval History: requesting discharge.       Review of Systems   Constitutional: Negative for activity change.   HENT: Negative for congestion.    Respiratory: Negative for chest tightness and shortness of breath.    Cardiovascular:  Negative for chest pain.   Gastrointestinal: Negative for abdominal distention.   Genitourinary: Negative for difficulty urinating.     Objective:     Vital Signs (Most Recent):  Temp: 97.6 °F (36.4 °C) (03/29/18 0605)  Pulse: 62 (03/29/18 0605)  Resp: 18 (03/29/18 0605)  BP: (!) 116/55 (03/29/18 0605)  SpO2: 95 % (03/29/18 0605) Vital Signs (24h Range):  Temp:  [96.2 °F (35.7 °C)-98.4 °F (36.9 °C)] 97.6 °F (36.4 °C)  Pulse:  [56-73] 62  Resp:  [18-20] 18  SpO2:  [95 %-97 %] 95 %  BP: (111-137)/(55-73) 116/55     Weight: 66.8 kg (147 lb 4.3 oz)  Body mass index is 29.74 kg/m².    Intake/Output Summary (Last 24 hours) at 03/29/18 0804  Last data filed at 03/29/18 0733   Gross per 24 hour   Intake                0 ml   Output              100 ml   Net             -100 ml      Physical Exam   Constitutional: She is oriented to person, place, and time. She appears well-developed and well-nourished.   HENT:   Head: Normocephalic and atraumatic.   Cardiovascular: Normal rate and normal heart sounds.    Pulmonary/Chest: Effort normal and breath sounds normal. No respiratory distress. She has no wheezes. She has no rales.   Neurological: She is alert and oriented to person, place, and time.   Psychiatric: She has a normal mood and affect. Her behavior is normal.   Vitals reviewed.      Significant Labs:   BMP:   Recent Labs  Lab 03/28/18  0730   GLU 72      K 3.5      CO2 25   BUN 22   CREATININE 1.4   CALCIUM 9.5   MG 1.9     CBC:   Recent Labs  Lab 03/27/18  1007 03/28/18  0729   WBC 4.27 4.21   HGB 13.2 12.9   HCT 39.3 38.9   * 131*       Significant Imaging:     Assessment/Plan:      * Acute on chronic congestive heart failure    Unknown type as no previous EKG. Presenting with a BNP of 4,335 in setting of normal renal function.    Will check Echo. Start on IV lasix.  Expect short hospital stay.  Non compliance with diet and medications likely the cause.   Echo today. Continue lasix.  Clinically  improved. BNP in am.  Likely home on 3/29. EF of 40-45%.will review meds.  CC has resolved. Acute combined systolic and diastolic heart failure.- resolved.               Elevated troponin    In setting of acute heart failure and CKD III- flat pattern. No ACS.           CKD (chronic kidney disease), stage III    At baseline.           Non-compliance    As discussed.             Hyperlipidemia    Resume home meds.           Benign essential hypertension    Presented with elevated BP's- not taking meds as prescribed. Discussion held with the patient and family           R ankle pain- no fracture. Possible ligament injury. Will have ortho follow up.     VTE Risk Mitigation         Ordered     enoxaparin injection 30 mg  Daily     Route:  Subcutaneous        03/28/18 0929     IP VTE HIGH RISK PATIENT  Once      03/27/18 2049        Will d/c to home.         Sly Garcia MD  Department of Hospital Medicine   [facility]

## 2018-03-29 NOTE — DISCHARGE SUMMARY
[facility] Hospital Medicine  Discharge Summary      Patient Name: Kasie Faye  MRN: [id number]  Admission Date: 3/27/2018  Hospital Length of Stay: 2 days  Discharge Date and Time:  03/29/2018 8:10 AM  Attending Physician: Sly Adams MD   Discharging Provider: Sly Adams MD  Primary Care Provider: [facility]      HPI:   Mrs. Faye is a 84 yo F from home who presents with a CC of SOB since last night. The patient has a known history of hypertension but states that she is non compliant with both diet and medications.  She was in her normal state of health until last night when she started to experience SOB. The patient admits to a known history of heart failure and noted a hospital admission last in 2010 for this. She does not have an echo on file here. She presents to the ED and is found to have a BNP of 4,335 in the setting of normal renal function.  She denied any CP. She states this feels similar to her CHF in 2010. The patient is resting comfortably and did not present with respiratory distress or failure.  The patient lives at home alone and does not use a walker or cane.        * No surgery found *      Hospital Course:   The patient presents with a CC of SOB and is found to be in heart failure with a BNP of 4,335. The patient has a known history of CHF but does not have an echo here.  She was started on IV lasix.  An echo of the heart was ordered. The patient clinically improved rapidly. Her Echo showed an EF of 40-45% with diastolic heart failure. She will be discharged on the appropriate meds. She is requesting discharge. No 02 requirements. CC of SOB has resolved.  Activity as tolerated. Diet- low NA. Follow up with PCP in one week. Will have cards follow up in one week with Dr. Rain. Follow up with ortho in 1-2 weeks for R ankle pain- possible ligament injury per x-rays.       I will call the patient at home to check on on 3/30.             Consults:     No new Assessment & Plan notes have been filed under this hospital service since the last note was generated.  Service: Hospital Medicine    Final Active Diagnoses:    Diagnosis Date Noted POA    PRINCIPAL PROBLEM:  Acute on chronic congestive heart failure [I50.9] 03/27/2018 Yes    CKD (chronic kidney disease), stage III [N18.3] 03/28/2018 Yes    Non-compliance [Z91.19] 03/27/2018 Not Applicable    Benign essential hypertension [I10] 08/28/2016 Yes     Chronic    Hyperlipidemia [E78.5] 08/28/2016 Yes     Chronic      Problems Resolved During this Admission:    Diagnosis Date Noted Date Resolved POA    Elevated troponin [R74.8] 03/28/2018 03/29/2018 Yes       Discharged Condition: good    Disposition: Home or Self Care    Follow Up:  Follow-up Information     Yunier Abbott In 1 week.    Contact information:  [id number] [phone]             Kevin Rain MD In 1 week.    Specialty:  Cardiology  Contact information:  [address] [address] [phone]                 Patient Instructions:     Diet Cardiac     Activity as tolerated         Significant Diagnostic Studies:    Pending Diagnostic Studies:     Procedure Component Value Units Date/Time    Basic metabolic panel  [[id number]] Collected:  03/29/18 0742    Order Status:  Sent Lab Status:  In process Updated:  03/29/18 0754    Specimen:  Blood from Blood     Brain natriuretic peptide [[id number]] Collected:  03/29/18 0742    Order Status:  Sent Lab Status:  In process Updated:  03/29/18 0754    Specimen:  Blood from Blood          Medications:  Reconciled Home Medications:      Medication List      CONTINUE taking these medications    acetaminophen 500 MG tablet  Commonly known as:  TYLENOL     amLODIPine 10 MG tablet  Commonly known as:  NORVASC     aspirin 81 MG EC tablet  Commonly known as:  ECOTRIN     atorvastatin 40 MG tablet  Commonly known as:  LIPITOR     carvedilol 12.5 MG  tablet  Commonly known as:  COREG     furosemide 40 MG tablet  Commonly known as:  LASIX     losartan 100 MG tablet  Commonly known as:  COZAAR            Indwelling Lines/Drains at time of discharge:   Lines/Drains/Airways          No matching active lines, drains, or airways          Time spent on the discharge of patient:  < 30  minutes  Patient was seen and examined on the date of discharge and determined to be suitable for discharge.         Sly Garcia MD  Department of Hospital Medicine  [facility]

## 2018-03-29 NOTE — PLAN OF CARE
[facility]    HOME HEALTH ORDERS  FACE TO FACE ENCOUNTER    Patient Name: Kasie Faye  YOB: 1932    PCP: Yunier Abbott   PCP Address: [address]  PCP Phone Number: [phone]  PCP Fax: [phone]    Encounter Date: 03/29/2018    Admit to Home Health    Diagnoses: CHF   Active Hospital Problems    Diagnosis  POA    *Acute on chronic congestive heart failure [I50.9]  Yes     Priority: 1 - High    CKD (chronic kidney disease), stage III [N18.3]  Yes    Non-compliance [Z91.19]  Not Applicable    Benign essential hypertension [I10]  Yes     Chronic    Hyperlipidemia [E78.5]  Yes     Chronic      Resolved Hospital Problems    Diagnosis Date Resolved POA    Elevated troponin [R74.8] 03/29/2018 Yes       No future appointments.  Follow-up Information     [facility] In 1 week.    Contact information:  [address] [place] [phone]             Kevin Rain MD In 1 week.    Specialty:  Cardiology  Contact information:  [address] [place] [id number] [phone]             Wesley Guadalupe MD In 1 week.    Specialty:  Orthopedic Surgery  Contact information:  [address] [facility]  [address]  [place] [facility] [phone]                     I have seen and examined this patient face to face today. My clinical findings that support the need for the home health skilled services and home bound status are the following:  Weakness/numbness causing balance and gait disturbance due to Weakness/Debility making it taxing to leave home.  Patient with medication mismanagement issues requiring home bound status as evidenced by  Poor adherence to medication regimen/dosage.    Allergies:  Review of patient's allergies indicates:   Allergen Reactions    Darvocet a500 [propoxyphene n-acetaminophen] Other (See Comments)     Dizziness       Diet: 2 gram sodium diet    Activities: activity as tolerated    Nursing:   SN to  complete comprehensive assessment including routine vital signs. Instruct on disease process and s/s of complications to report to MD. Review/verify medication list sent home with the patient at time of discharge  and instruct patient/caregiver as needed. Frequency may be adjusted depending on start of care date.    Notify MD if SBP > 160 or < 90; DBP > 90 or < 50; HR > 120 or < 50; Temp > 101; Other:       CONSULTS:    Physical Therapy to evaluate and treat. Evaluate for home safety and equipment needs; Establish/upgrade home exercise program. Perform / instruct on therapeutic exercises, gait training, transfer training, and Range of Motion.  Occupational Therapy to evaluate and treat. Evaluate home environment for safety and equipment needs. Perform/Instruct on transfers, ADL training, ROM, and therapeutic exercises.  Aide to provide assistance with personal care, ADLs, and vital signs.    MISCELLANEOUS CARE:  Heart Failure:      SN to instruct on the following:    Instruct on the definition of CHF.   Instruct on the signs/sympoms of CHF to be reported.   Instruct on and monitor daily weights.   Instruct on factors that cause exacerbation.   Instruct on action, dose, schedule, and side effects of medications.   Instruct on diet as prescribed.   Instruct on activity allowed.   Instruct on life-style modifications for life long management of CHF   SN to assess compliance with daily weights, diet, medications, fluid retention,    safety precautions, activities permitted and life-style modifications.   Additional 1-2 SN visits per week as needed for signs and symptoms     of CHF exacerbation.            Medications: Review discharge medications with patient and family and provide education.      Current Discharge Medication List      CONTINUE these medications which have NOT CHANGED    Details   amlodipine (NORVASC) 10 MG tablet Take 10 mg by mouth once daily.      aspirin (ECOTRIN) 81 MG EC tablet Take 81 mg by mouth  once daily.      atorvastatin (LIPITOR) 40 MG tablet Take 40 mg by mouth once daily.      carvedilol (COREG) 12.5 MG tablet Take 12.5 mg by mouth 2 (two) times daily with meals.      furosemide (LASIX) 40 MG tablet Take 40 mg by mouth once daily.      losartan (COZAAR) 100 MG tablet Take 100 mg by mouth once daily.      acetaminophen (TYLENOL) 500 MG tablet Take 500 mg by mouth every 6 (six) hours as needed for Pain.             I certify that this patient is confined to her home and needs intermittent skilled nursing care, physical therapy and occupational therapy.

## 2018-03-29 NOTE — SUBJECTIVE & OBJECTIVE
Interval History: requesting discharge.       Review of Systems   Constitutional: Negative for activity change.   HENT: Negative for congestion.    Respiratory: Negative for chest tightness and shortness of breath.    Cardiovascular: Negative for chest pain.   Gastrointestinal: Negative for abdominal distention.   Genitourinary: Negative for difficulty urinating.     Objective:     Vital Signs (Most Recent):  Temp: 97.6 °F (36.4 °C) (03/29/18 0605)  Pulse: 62 (03/29/18 0605)  Resp: 18 (03/29/18 0605)  BP: (!) 116/55 (03/29/18 0605)  SpO2: 95 % (03/29/18 0605) Vital Signs (24h Range):  Temp:  [96.2 °F (35.7 °C)-98.4 °F (36.9 °C)] 97.6 °F (36.4 °C)  Pulse:  [56-73] 62  Resp:  [18-20] 18  SpO2:  [95 %-97 %] 95 %  BP: (111-137)/(55-73) 116/55     Weight: 66.8 kg (147 lb 4.3 oz)  Body mass index is 29.74 kg/m².    Intake/Output Summary (Last 24 hours) at 03/29/18 0804  Last data filed at 03/29/18 0733   Gross per 24 hour   Intake                0 ml   Output              100 ml   Net             -100 ml      Physical Exam   Constitutional: She is oriented to person, place, and time. She appears well-developed and well-nourished.   HENT:   Head: Normocephalic and atraumatic.   Cardiovascular: Normal rate and normal heart sounds.    Pulmonary/Chest: Effort normal and breath sounds normal. No respiratory distress. She has no wheezes. She has no rales.   Neurological: She is alert and oriented to person, place, and time.   Psychiatric: She has a normal mood and affect. Her behavior is normal.   Vitals reviewed.      Significant Labs:   BMP:   Recent Labs  Lab 03/28/18  0730   GLU 72      K 3.5      CO2 25   BUN 22   CREATININE 1.4   CALCIUM 9.5   MG 1.9     CBC:   Recent Labs  Lab 03/27/18  1007 03/28/18  0729   WBC 4.27 4.21   HGB 13.2 12.9   HCT 39.3 38.9   * 131*       Significant Imaging:

## 2018-03-29 NOTE — PLAN OF CARE
Problem: Cardiac: Heart Failure (Adult)  Intervention: Promote Functional Ability   03/29/18 0437   Activity   Activity Assistance Provided independent   Daily Care Interventions   Self-Care Promotion independence encouraged;BADL personal objects within reach     Intervention: Provide Oxygenation/Ventilation/Perfusion Support   03/29/18 0437   Activity   Activity Type activity adjusted per tolerance   Positioning   Head of Bed (HOB) HOB at 30-45 degrees   Respiratory Interventions   Airway/Ventilation Management airway patency maintained     Intervention: Support Psychosocial Response to Heart Failure   03/29/18 0437   Coping/Psychosocial Interventions   Supportive Measures active listening utilized;verbalization of feelings encouraged   Psychosocial Support   Family/Support System Care self-care encouraged     Intervention: Gradually Correct Positive Fluid Balance   03/29/18 0437   Nutrition Interventions   Fluid/Electrolyte Management fluids restricted   Positioning   Body Position positioned/repositioned independently   Skin Interventions   Skin Protection Incontinence pads;Tubing/devices free from skin contact     Intervention: Prevent/Manage DVT/VTE Risk   03/29/18 0437   Minimize Embolism Risk   VTE Prevention/Management bleeding precautions maintained;ambulation promoted;intravenous hydration;ROM (active) performed   OTHER   VTE Required Core Measure Pharmacological prophylaxis initiated/maintained       Goal: Signs and Symptoms of Listed Potential Problems Will be Absent, Minimized or Managed (Cardiac: Heart Failure)  Signs and symptoms of listed potential problems will be absent, minimized or managed by discharge/transition of care (reference Cardiac: Heart Failure (Adult) CPG).   Outcome: Ongoing (interventions implemented as appropriate)   03/29/18 0437   Cardiac: Heart Failure   Problems Assessed (Heart Failure) all   Problems Present (Heart Failure) cardiac pump dysfunction;situational response

## 2018-04-02 ENCOUNTER — PATIENT OUTREACH (OUTPATIENT)
Dept: ADMINISTRATIVE | Facility: CLINIC | Age: 83
End: 2018-04-02

## 2018-04-02 NOTE — PATIENT INSTRUCTIONS
Fall Prevention   Falls often occur due to slipping, tripping or losing your balance. Here are ways to reduce your risk of falling again.   Was there anything that caused your fall that can be fixed, removed or replaced?   Make your home safe by keeping walkways clear of objects you may trip over.   Use non-slip pads under rugs.   Do not walk in poorly lit areas.   Do not stand on chairs or wobbly ladders.   Use caution when reaching overhead or looking upward. This position can cause a loss of balance.   Be sure your shoes fit properly, have non-slip bottoms and are in good condition.   Be cautious when going up and down stairs, curbs, and when walking on uneven sidewalks.   If your balance is poor, consider using a cane or walker.   If your fall was related to alcohol use, stop or limit alcohol intake.   If your fall was related to use of sleeping medicines, talk to your doctor about this. You may need to reduce your dosage at bedtime if you awaken during the night to go to the bathroom.   To reduce the need for nighttime bathroom trips:   Avoid drinking fluids for several hours before going to bed   Empty your bladder before going to bed   Men can keep a urinal at the bedside  Stay as active as you can. Balance, flexibility, strength, and endurance all come from exercise. They all play a role in preventing falls. Ask your heathcare provider which types of activity are right for you.  © 8990-9679 [employer]. [address] [place] [address], [place] [address]. All rights reserved. This information is not intended as a substitute for professional medical care. Always follow your healthcare professional's instructions.